# Patient Record
Sex: FEMALE | Race: WHITE | NOT HISPANIC OR LATINO | URBAN - NONMETROPOLITAN AREA
[De-identification: names, ages, dates, MRNs, and addresses within clinical notes are randomized per-mention and may not be internally consistent; named-entity substitution may affect disease eponyms.]

---

## 2017-03-16 NOTE — PATIENT DISCUSSION
Pterygium Counseling:  I have explained the diagnosis of pterygium and its pathophysiology. If the lesion increases in size, it may cause visual symptoms due to induced astigmatism or direct encroachment onto the visual axis. I recommended that the patient use artificial tears to relieve any dryness associated with the pterygium and to increase UV protection. If the pterygium becomes inflamed, uncomfortable or is cosmetically unacceptable, then surgery can be performed to remove the growth. Also, if the pterygium has caused astigmatism, the growth may need to be removed prior to cataract testing and surgery. The recurrence rate after surgery has been explained to the patient along with the need for pathological examination of the pterygium. Return for follow-up as scheduled.

## 2017-03-16 NOTE — PATIENT DISCUSSION
Continue: Refresh Celluvisc (carboxymethylcellulose sodium): dropperette,gel: 1% 1 drop three times a day

## 2017-03-16 NOTE — PATIENT DISCUSSION
PTERYGIUM, OS :  STABLE. PRESCRIBE ARTIFICIAL TEARS AS NEEDED, DECREASE EXPOSURE AND INCREASE UV PROTECTION.

## 2017-03-16 NOTE — PATIENT DISCUSSION
CHOROIDAL NEVUS, OS: PRESCRIBED UV PROTECTION TO MINIMIZE UV EXPOSURE. RETURN FOR FOLLOW-UP AS SCHEDULED.

## 2017-03-16 NOTE — PATIENT DISCUSSION
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 RECOMMENDS OMEGA-3 FISH OIL WITH PRIMARY CARE PHYSICIANS APPROVAL. RETURN FOR FOLLOW-UP AS SCHEDULED OR SOONER IF SYMPTOMS WORSEN.

## 2017-04-24 NOTE — PATIENT DISCUSSION
New Prescription: Besivance (besifloxacin): drops,suspension: 0.6% 1 drop four times a day as directed into right eye 04-

## 2017-04-24 NOTE — PATIENT DISCUSSION
CHALAZION, OD: PRESCRIBED WARM COMPRESSES 4 TIMES A DAY, EYELID SCRUBS, ORAL FISH OIL,  AND PRESCRIBED BESIVANCE QID OD AND ORAL KEFLEX TID x 7 DAYS.

## 2017-04-24 NOTE — PATIENT DISCUSSION
Chalazion Counseling:  I explained to the patient that a chalazion is an inflammatory reaction to trapped oil secretions in the eyelid. I also explained that while chalazions usually respond well to treatment, some people are prone to recurrences of them. The patient was instructed on the use of warm compresses on the chalazion for five to ten minutes, three to four times per day. Return for follow-up as scheduled or sooner if symptoms worsen.

## 2017-04-24 NOTE — PATIENT DISCUSSION
New Prescription: Keflex (cephalexin): capsule: 500 mg 1 capsule three times a day as directed by mouth 04-

## 2017-04-27 NOTE — PATIENT DISCUSSION
Continue: Besivance (besifloxacin): drops,suspension: 0.6% 1 drop four times a day as directed into right eye 04-

## 2017-04-27 NOTE — PATIENT DISCUSSION
S/P YAG OU: DOING WELL, FOLLOW. MAINTAIN FOLLOW UP WITH DR. Johnna Valle FOR CHALAZION AS SCHEDULED.

## 2017-05-02 NOTE — PATIENT DISCUSSION
MEIBOMIAN GLAND DYSFUNCTION, OU:  PRESCRIBE WARM COMPRESSES AND EYELID SCRUBS QD-BID, ARTIFICIAL TEARS BID-QID, THE DAILY INTAKE OF OMEGA-3 FATTY ACIDS

## 2017-05-02 NOTE — PATIENT DISCUSSION
Stopped Today: amoxicillin (amoxicillin): tablet: 500 mg 1 tablet twice a day with meals by mouth 04-

## 2017-05-02 NOTE — PATIENT DISCUSSION
New Prescription: TobraDex (tobramycin-dexamethasone): ointment: 0.3-0.1% a small amount at bedtime as needed into affected eye 05-

## 2017-05-02 NOTE — PATIENT DISCUSSION
Stopped Today: Besivance (besifloxacin): drops,suspension: 0.6% 1 drop four times a day as directed into right eye 04-

## 2017-06-02 NOTE — PATIENT DISCUSSION
CHALAZION OD with EPIDERMAL INCLUSION CYST: MUCH IMPROVED. CONTINUE WARM COMPRESSES, AND EYE LID SCRUBS. CONTINUE TOBRADEX GINETTE QHS UNTIL OUT. REFER TO OLEKSANDR FOR EVAL.

## 2018-08-20 NOTE — PATIENT DISCUSSION
New Prescription: erythromycin (erythromycin): ointment: 5 mg/gram (0.5 %) a small amount at bedtime as directed into both eyes 08-

## 2018-08-20 NOTE — PATIENT DISCUSSION
EYAD OU:  PRESCRIBE ARTIFICIAL TEARS BID - QID. DECREASE OUTDOOR EXPOSURE AND USE UV PROTECTION/ SUNGLASSES WITH OUTDOOR ACTIVITIES. RETURN FOR FOLLOW UP AS SCHEDULED.

## 2018-08-20 NOTE — PATIENT DISCUSSION
Pinguecula Counseling:  I have explained to the patient at length the diagnosis of pinguecula and its pathophysiology. I recommended the patient adequately protect their eyes from excessive UV light and dry, juanita conditions. The use of artificial tears in dry conditions was encouraged. Return for follow-up as scheduled.

## 2018-08-20 NOTE — PATIENT DISCUSSION
*BLEPHARITIS, OU: PRESCRIBE WARM COMPRESSES AND EYELID SCRUBS QD-BID, ARTIFICIAL TEARS BID-QID, AND USE ERYTHROMYCIN OINTMENT QHS OU WHEN REDNESS BECOMES BOTHERSOME. RECOMMEND THE DAILY INTAKE OF OMEGA-3 FATTY ACIDS WITH PRIMARY CARE PHYSICIANS APPROVAL. RETURN FOR FOLLOW-UP AS SCHEDULED.

## 2019-08-13 ENCOUNTER — IMPORTED ENCOUNTER (OUTPATIENT)
Dept: URBAN - NONMETROPOLITAN AREA CLINIC 1 | Facility: CLINIC | Age: 39
End: 2019-08-13

## 2019-08-13 PROBLEM — L02.01: Noted: 2019-08-13

## 2019-08-13 PROCEDURE — 99203 OFFICE O/P NEW LOW 30 MIN: CPT

## 2019-08-13 NOTE — PATIENT DISCUSSION
Absess of Frontal Forehead and secondardy hematoma to bilateral lower lids- Recommend begin Augmentin 1 /125 BID for 5 days.- Rx for Tylenol III given today for pain q4hrs or PRN (qty 12)- Recommend follow-up on Thursday.

## 2019-08-15 ENCOUNTER — IMPORTED ENCOUNTER (OUTPATIENT)
Dept: URBAN - NONMETROPOLITAN AREA CLINIC 1 | Facility: CLINIC | Age: 39
End: 2019-08-15

## 2019-08-15 PROCEDURE — 99212 OFFICE O/P EST SF 10 MIN: CPT

## 2019-08-15 NOTE — PATIENT DISCUSSION
Absess of Frontal Forehead and secondardy hematoma to bilateral lower lids- Recommend finish Augmentin 1 /125 BID.- Recommend follow-up PRN

## 2022-04-09 ASSESSMENT — VISUAL ACUITY
OS_CC: 20/20
OD_CC: 20/25+2
OD_CC: 20/25+2
OS_CC: 20/20